# Patient Record
Sex: MALE | Race: WHITE
[De-identification: names, ages, dates, MRNs, and addresses within clinical notes are randomized per-mention and may not be internally consistent; named-entity substitution may affect disease eponyms.]

---

## 2018-11-29 ENCOUNTER — HOSPITAL ENCOUNTER (EMERGENCY)
Dept: HOSPITAL 25 - UCCORT | Age: 33
Discharge: HOME | End: 2018-11-29
Payer: COMMERCIAL

## 2018-11-29 VITALS — SYSTOLIC BLOOD PRESSURE: 125 MMHG | DIASTOLIC BLOOD PRESSURE: 82 MMHG

## 2018-11-29 DIAGNOSIS — Z88.8: ICD-10-CM

## 2018-11-29 DIAGNOSIS — K04.7: Primary | ICD-10-CM

## 2018-11-29 DIAGNOSIS — F17.210: ICD-10-CM

## 2018-11-29 PROCEDURE — 99202 OFFICE O/P NEW SF 15 MIN: CPT

## 2018-11-29 PROCEDURE — G0463 HOSPITAL OUTPT CLINIC VISIT: HCPCS

## 2018-11-29 NOTE — [facility]
Dental HPI





- HPI Summary


HPI Summary: 





The patient is a 33-year-old male with a left lower toothache 1 day.  When he 

woke this morning his left jaw was swollen.  He denies any fever.  He is not a 

diabetic.  He denies a history of a heart murmur.





- History of Current Complaint


Chief Complaint: UCDentalProblem


Stated Complaint: LEFT FACIAL SWEELING


Hx Obtained From: Patient


Onset/Duration: Gradual Onset, Lasting Hours


Severity: Severe


Pain Intensity: 8


Pain Scale Used: 0-10 Numeric


Aggravating Factor(s): Heat, Cold, Chewing


Alleviating Factor(s): OTC Meds


Related History: Swelling





- Allergies/Home Medications


Allergies/Adverse Reactions: 


 Allergies











Allergy/AdvReac Type Severity Reaction Status Date / Time


 


quetiapine [From Seroquel] Allergy  Hallucinati Verified 11/29/18 14:02





   ons  











Home Medications: 


 Home Medications





Acetaminophen [Acetaminophen Extra Strength] 1,000 mg PO Q6H PRN 11/29/18 [

History Confirmed 11/29/18]


Citalopram TAB* [CeleXA TAB*] 10 mg PO DAILY 11/29/18 [History Confirmed 11/29/ 18]











PMH/Surg Hx/FS Hx/Imm Hx


Previously Healthy: Yes





- Surgical History


Surgical History: None





- Family History


Known Family History: Positive: Other - [place]


   Negative: Cardiac Disease, Hypertension, Diabetes





- Social History


Alcohol Use: Occasionally


Substance Use Type: Marijuana


Substance Use Comment - Amount & Last Used: Sometimes


Smoking Status (MU): Heavy Every Day Tobacco Smoker


Type: Cigarettes


Amount Used/How Often: 1 PPD


Length of Time of Smoking/Using Tobacco: Since Age 20





Review of Systems


All Other Systems Reviewed And Are Negative: Yes


Constitutional: Positive: Negative


Skin: Positive: Negative


Eyes: Positive: Negative


ENT: Positive: Dental Pain


Respiratory: Positive: Negative


Cardiovascular: Positive: Negative


Gastrointestinal: Positive: Negative


Genitourinary: Positive: Negative


Motor: Positive: Negative


Neurovascular: Positive: Negative


Musculoskeletal: Positive: Negative


Neurological: Positive: Negative


Psychological: Positive: Negative





Physical Exam


Triage Information Reviewed: Yes


Appearance: Well-Appearing, No Pain Distress, Well-Nourished


Vital Signs: 


 Initial Vital Signs











Temp  98 F   11/29/18 14:01


 


Pulse  66   11/29/18 14:01


 


Resp  16   11/29/18 14:01


 


BP  125/82   11/29/18 14:01


 


Pulse Ox  100   11/29/18 14:01











Eyes: Positive: Conjunctiva Clear


ENT: Positive: Hearing grossly normal.  Negative: Nasal congestion, Nasal 

drainage, Trismus, Muffled voice, Hoarse voice


Neck: Positive: Supple, Nontender


Respiratory: Positive: Lungs clear, Normal breath sounds, No respiratory 

distress, No accessory muscle use


Cardiovascular: Positive: RRR, No Murmur


Musculoskeletal: Positive: ROM Intact, No Edema


Neurological: Positive: Alert


Psychological Exam: Normal





Dental Complaint Course/Dx





- Differential Dx/Diagnosis


Provider Diagnosis: 


 Dental abscess








Discharge





- Sign-Out/Discharge


Documenting (check all that apply): Patient Departure


All imaging exams completed and their final reports reviewed: No Studies





- Discharge Plan


Condition: Stable


Disposition: HOME


Prescriptions: 


Ibuprofen TAB* [Motrin TAB*] 600 mg PO Q6H PRN #40 tab


 PRN Reason: Pain


Penicillin  MG TAB(NF) [Penicillin  mg Tab] 500 mg PO QID #28 tab


Patient Education Materials:  Dental Abscess (ED)


Referrals: 


No Primary Care Phys,NOPCP [Primary Care Provider] - 


Additional Instructions: 


recheck in 1-2 days if not improved





to ER for worsening symptoms





see dentist as planned





- Billing Disposition and Condition


Condition: STABLE


Disposition: Home